# Patient Record
Sex: MALE | Employment: OTHER | URBAN - METROPOLITAN AREA
[De-identification: names, ages, dates, MRNs, and addresses within clinical notes are randomized per-mention and may not be internally consistent; named-entity substitution may affect disease eponyms.]

---

## 2018-06-29 ENCOUNTER — HOSPITAL ENCOUNTER (EMERGENCY)
Age: 48
Discharge: HOME | End: 2018-06-29
Payer: COMMERCIAL

## 2018-06-29 VITALS
HEIGHT: 70 IN | TEMPERATURE: 99 F | WEIGHT: 230 LBS | DIASTOLIC BLOOD PRESSURE: 104 MMHG | HEART RATE: 72 BPM | SYSTOLIC BLOOD PRESSURE: 162 MMHG | RESPIRATION RATE: 14 BRPM | BODY MASS INDEX: 32.93 KG/M2 | OXYGEN SATURATION: 96 %

## 2018-06-29 DIAGNOSIS — I10 HYPERTENSION, UNSPECIFIED TYPE: ICD-10-CM
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
## 2018-06-29 PROCEDURE — 99283 EMERGENCY DEPT VISIT LOW MDM: CPT

## 2018-06-29 RX ORDER — CEPHALEXIN 500 MG/1
500 CAPSULE ORAL 4 TIMES DAILY
Qty: 20 CAPSULE | Refills: 0 | Status: SHIPPED | OUTPATIENT
Start: 2018-06-29 | End: 2018-07-04

## 2018-06-30 NOTE — ED PROVIDER NOTES
Patient Seen in: [facility] In [place]    History   Patient presents with:  Lump Mass (integumentary)    Stated Complaint: Lump on left side of forehead    47-year-old  male with a history of DVT, hypertension, hyperlipidemia no cervical adenopathy. Neurological: He is alert. Psychiatric: He has a normal mood and affect.        ED Course   Labs Reviewed - No data to display    ED Course as of Jun 29 1942  ------------------------------------------------------------      MDM

## 2018-10-27 ENCOUNTER — APPOINTMENT (OUTPATIENT)
Dept: ULTRASOUND IMAGING | Age: 48
End: 2018-10-27
Attending: EMERGENCY MEDICINE
Payer: COMMERCIAL

## 2018-10-27 ENCOUNTER — APPOINTMENT (OUTPATIENT)
Dept: GENERAL RADIOLOGY | Age: 48
End: 2018-10-27
Attending: EMERGENCY MEDICINE
Payer: COMMERCIAL

## 2018-10-27 ENCOUNTER — HOSPITAL ENCOUNTER (EMERGENCY)
Age: 48
Discharge: HOME | End: 2018-10-27
Attending: EMERGENCY MEDICINE
Payer: COMMERCIAL

## 2018-10-27 VITALS
RESPIRATION RATE: 16 BRPM | OXYGEN SATURATION: 97 % | SYSTOLIC BLOOD PRESSURE: 128 MMHG | TEMPERATURE: 98 F | HEIGHT: 70 IN | BODY MASS INDEX: 32.93 KG/M2 | HEART RATE: 60 BPM | WEIGHT: 230 LBS | DIASTOLIC BLOOD PRESSURE: 79 MMHG

## 2018-10-27 DIAGNOSIS — L08.9 SKIN INFECTION: ICD-10-CM

## 2018-10-27 DIAGNOSIS — M72.2 PLANTAR FASCIITIS OF RIGHT FOOT: Primary | ICD-10-CM

## 2018-10-27 PROCEDURE — 73650 X-RAY EXAM OF HEEL: CPT | Performed by: EMERGENCY MEDICINE

## 2018-10-27 PROCEDURE — 99284 EMERGENCY DEPT VISIT MOD MDM: CPT

## 2018-10-27 PROCEDURE — 93971 EXTREMITY STUDY: CPT | Performed by: EMERGENCY MEDICINE

## 2018-10-27 NOTE — ED NOTES
WHEN [place] AR[place]IV[place] HE WAS UPSET AND REFUSED TO REMOVE HIS SHORTS EVEN WITH A BLANKET. PT WAS RAISING HIS VOICE AND REPEATED SEVERAL TIMES THAT NO ONE IS TO GO NEAR HIS GROIN. INSTRUCTED [facility]S. STAFF TO DO WHAT THEY CAN.   CHARISMA VALERA AWARE

## 2018-10-27 NOTE — ED PROVIDER NOTES
Patient Seen in: THE [facility] Emergency Department In [facility]    History   Patient presents with:  Swelling Edema (cardiovascular, metabolic)    Stated Complaint: RIGHT LEG SWOLLEN    HPI    This is a 42-year-old male complaining of right ankle swelling.   Bhavik Edgar nontender the on the ventral surface overlying the calcaneus there is some mild tenderness without swelling neurovascular is intact distally on the skin on the medial aspect of the distal third of the lower leg there was a small area of redness perhaps dim

## 2021-12-12 ENCOUNTER — HOSPITAL ENCOUNTER (EMERGENCY)
Age: 51
Discharge: HOME | End: 2021-12-12
Attending: EMERGENCY MEDICINE
Payer: COMMERCIAL

## 2021-12-12 VITALS
HEIGHT: 70 IN | RESPIRATION RATE: 18 BRPM | OXYGEN SATURATION: 96 % | SYSTOLIC BLOOD PRESSURE: 152 MMHG | HEART RATE: 69 BPM | DIASTOLIC BLOOD PRESSURE: 86 MMHG | BODY MASS INDEX: 33.64 KG/M2 | WEIGHT: 235 LBS | TEMPERATURE: 98 F

## 2021-12-12 DIAGNOSIS — H10.213 CHEMICAL CONJUNCTIVITIS OF BOTH EYES: Primary | ICD-10-CM

## 2021-12-12 PROCEDURE — 99283 EMERGENCY DEPT VISIT LOW MDM: CPT

## 2021-12-12 RX ORDER — TOBRAMYCIN 3 MG/ML
2 SOLUTION/ DROPS OPHTHALMIC
Qty: 1 EACH | Refills: 0 | Status: SHIPPED | OUTPATIENT
Start: 2021-12-12 | End: 2021-12-17

## 2021-12-12 RX ORDER — TETRACAINE HYDROCHLORIDE 5 MG/ML
1 SOLUTION OPHTHALMIC ONCE
Status: COMPLETED | OUTPATIENT
Start: 2021-12-12 | End: 2021-12-12

## 2021-12-12 RX ORDER — ALPRAZOLAM 0.5 MG/1
0.5 TABLET ORAL ONCE
Status: COMPLETED | OUTPATIENT
Start: 2021-12-12 | End: 2021-12-12

## 2021-12-12 NOTE — ED INITIAL ASSESSMENT (HPI)
Chemical exposure to his eyes.  He was spraying bug spray earlier today and it didn't start bothering him until 10pm.

## 2021-12-12 NOTE — ED PROVIDER NOTES
Patient Seen in: [facility] Emergency Department In [place]      History   Patient presents with: Eye Visual Problem    Stated Complaint: Chemical exposure to his eyes.  He was spraying bug spray earlier today and it d*    Subjective:   HPI    Patient is Uncorrected  Left Eye Chart Acuity: 20/25, Uncorrected    Physical Exam  GENERAL: Well-developed, well-nourished male sitting up breathing easily in no apparent distress. Patient is nontoxic in appearance. HEENT: Head is normocephalic, atraumatic.  Pupils patient return to the ER immediately if symptoms worsen or if any other problems arise. Patient discharged home at this time.                      Disposition and Plan     Clinical Impression:  Chemical conjunctivitis of both eyes  (primary encounter diagn

## 2021-12-12 NOTE — ED QUICK NOTES
Pt ready to try eye irrigation again. He lets staff place both onofre lenses and sts he will try to tolerate the procedure for 5 minutes.

## 2021-12-12 NOTE — ED QUICK NOTES
Poison Control \"supportive care\" recommendations: flushing eyes, optho follow up, stain the eye to r/o corneal abrasion.     Case #:  [id number]

## (undated) NOTE — ED AVS SNAPSHOT
Mr. Bartolome Guy   MRN: [id number]    Department:  THE [facility] Emergency Department in [place]   Date of Visit:  10/27/2018           Disclosure     Insurance plans vary and the physician(s) referred by the ER may not be covered by your plan.  Joi tell this physician (or your personal doctor if your instructions are to return to your personal doctor) about any new or lasting problems. The primary care or specialist physician will see patients referred from the [facility] Emergency Department.  Rafael Stanley

## (undated) NOTE — ED AVS SNAPSHOT
Mr. Gemma Whitney   MRN: [id number]    Department:  THE [facility] Emergency Department in [place]   Date of Visit:  6/29/2018           Disclosure     Insurance plans vary and the physician(s) referred by the ER may not be covered by your plan.  Please tell this physician (or your personal doctor if your instructions are to return to your personal doctor) about any new or lasting problems. The primary care or specialist physician will see patients referred from the [facility] Emergency Department.  Valentina Cespedes